# Patient Record
Sex: MALE | URBAN - METROPOLITAN AREA
[De-identification: names, ages, dates, MRNs, and addresses within clinical notes are randomized per-mention and may not be internally consistent; named-entity substitution may affect disease eponyms.]

---

## 2023-11-28 ENCOUNTER — OFFICE VISIT (OUTPATIENT)
Dept: URBAN - METROPOLITAN AREA CLINIC 54 | Facility: CLINIC | Age: 33
End: 2023-11-28
Payer: COMMERCIAL

## 2023-11-28 ENCOUNTER — LAB OUTSIDE AN ENCOUNTER (OUTPATIENT)
Dept: URBAN - METROPOLITAN AREA CLINIC 54 | Facility: CLINIC | Age: 33
End: 2023-11-28

## 2023-11-28 ENCOUNTER — DASHBOARD ENCOUNTERS (OUTPATIENT)
Age: 33
End: 2023-11-28

## 2023-11-28 VITALS
HEIGHT: 68 IN | WEIGHT: 145 LBS | BODY MASS INDEX: 21.98 KG/M2 | HEART RATE: 89 BPM | SYSTOLIC BLOOD PRESSURE: 126 MMHG | DIASTOLIC BLOOD PRESSURE: 79 MMHG | TEMPERATURE: 98.2 F

## 2023-11-28 DIAGNOSIS — R10.30 LOWER ABDOMINAL PAIN: ICD-10-CM

## 2023-11-28 DIAGNOSIS — R11.2 NAUSEA AND VOMITING, UNSPECIFIED VOMITING TYPE: ICD-10-CM

## 2023-11-28 DIAGNOSIS — N18.6 END STAGE RENAL DISEASE: ICD-10-CM

## 2023-11-28 DIAGNOSIS — R19.7 DIARRHEA, UNSPECIFIED TYPE: ICD-10-CM

## 2023-11-28 DIAGNOSIS — Z99.2 DEPENDENCE ON RENAL DIALYSIS: ICD-10-CM

## 2023-11-28 PROBLEM — 105502003: Status: ACTIVE | Noted: 2023-11-28

## 2023-11-28 PROBLEM — 714152005: Status: ACTIVE | Noted: 2023-11-28

## 2023-11-28 PROCEDURE — 99204 OFFICE O/P NEW MOD 45 MIN: CPT

## 2023-11-28 RX ORDER — AMLODIPINE BESYLATE 10 MG/1
1 TABLET TABLET ORAL ONCE A DAY
Status: ACTIVE | COMMUNITY

## 2023-11-28 RX ORDER — DIPHENOXYLATE HYDROCHLORIDE AND ATROPINE SULFATE 2.5; .025 MG/1; MG/1
1 TABLET AS NEEDED TABLET ORAL
OUTPATIENT

## 2023-11-28 RX ORDER — LABETALOL HYDROCHLORIDE 300 MG/1
1 TABLET TABLET, FILM COATED ORAL TWICE A DAY
Status: ACTIVE | COMMUNITY

## 2023-11-28 RX ORDER — DIPHENOXYLATE HYDROCHLORIDE AND ATROPINE SULFATE 2.5; .025 MG/1; MG/1
1 TABLET AS NEEDED TABLET ORAL
Status: ACTIVE | COMMUNITY

## 2023-11-28 RX ORDER — SUCROFERRIC OXYHYDROXIDE 500 MG/1
1 TABLET WITH MEALS TABLET, CHEWABLE ORAL TWICE A DAY
Status: ACTIVE | COMMUNITY

## 2023-11-28 RX ORDER — OMEPRAZOLE 20 MG/1
1 CAPSULE 30 MINUTES BEFORE MORNING MEAL CAPSULE, DELAYED RELEASE ORAL ONCE A DAY
Qty: 30 | Refills: 1 | OUTPATIENT
Start: 2023-11-28

## 2023-11-28 RX ORDER — INSULIN GLARGINE 100 [IU]/ML
AS DIRECTED INJECTION, SOLUTION SUBCUTANEOUS
Status: ACTIVE | COMMUNITY

## 2023-11-28 RX ORDER — LANTHANUM CARBONATE 1000 MG/1
1 TABLET WITH MEALS TABLET, CHEWABLE ORAL TWICE A DAY
Status: ACTIVE | COMMUNITY

## 2023-11-28 NOTE — HPI-TODAY'S VISIT:
11/28/23: Pt is a 32 yo male with a PMH of ESRD on HD M/W/F x 2 years, HTN, and DM who presents stating he was referred by nephrologist Dr. Traore (records/referral not available) for nausea/vomiting. Pt reports daily nausea and with vomiting up to 3 times a day x 1 year. Pt states he wakes up nauseous and vomits every monring, and then after every meal he develops crampy lower abd pain with fecal urgency, followed either by diarrhea or more nausea/vomiting. If pt doesn't eat, he says he does not have diarrhea. No hematochezia, but notes stool is dark while on iron Denies heartburn, reflux, early satiety, or unintentional weight loss. Taking Lomotil and Zofran prn with some relief. Denies NSAIDs or blood thinners. No cardiopulmonary disease. No prior EGD or cscope. No recent labs available.

## 2023-11-29 LAB
A/G RATIO: 1.9
ABSOLUTE BASOPHILS: 48
ABSOLUTE EOSINOPHILS: 150
ABSOLUTE LYMPHOCYTES: 1512
ABSOLUTE MONOCYTES: 516
ABSOLUTE NEUTROPHILS: 3774
ALBUMIN: 5
ALKALINE PHOSPHATASE: 68
ALT (SGPT): 10
AST (SGOT): 9
BASOPHILS: 0.8
BILIRUBIN, TOTAL: 0.5
BUN/CREATININE RATIO: 3
BUN: 25
CALCIUM: 10.3
CARBON DIOXIDE, TOTAL: 30
CHLORIDE: 95
CREATININE: 8.14
EGFR: 8
EOSINOPHILS: 2.5
GLOBULIN, TOTAL: 2.6
GLUCOSE: 119
HEMATOCRIT: 33.6
HEMOGLOBIN: 11.4
IMMUNOGLOBULIN A, QN, SERUM: 181
LYMPHOCYTES: 25.2
MCH: 31.9
MCHC: 33.9
MCV: 94.1
MONOCYTES: 8.6
MPV: 11.3
NEUTROPHILS: 62.9
PLATELET COUNT: 210
POTASSIUM: 4.2
PROTEIN, TOTAL: 7.6
RDW: 13.7
RED BLOOD CELL COUNT: 3.57
SODIUM: 140
T-TRANSGLUTAMINASE (TTG) IGA: <1
WHITE BLOOD CELL COUNT: 6

## 2024-01-29 ENCOUNTER — ERX REFILL RESPONSE (OUTPATIENT)
Dept: URBAN - METROPOLITAN AREA CLINIC 54 | Facility: CLINIC | Age: 34
End: 2024-01-29

## 2024-01-29 RX ORDER — OMEPRAZOLE 20 MG/1
1 CAPSULE 30 MINUTES BEFORE MORNING MEAL CAPSULE, DELAYED RELEASE ORAL ONCE A DAY
Qty: 30 | Refills: 1 | OUTPATIENT

## 2024-02-28 ENCOUNTER — EGD (OUTPATIENT)
Dept: URBAN - METROPOLITAN AREA MEDICAL CENTER 24 | Facility: MEDICAL CENTER | Age: 34
End: 2024-02-28